# Patient Record
Sex: MALE | Race: WHITE | NOT HISPANIC OR LATINO | Employment: FULL TIME | URBAN - METROPOLITAN AREA
[De-identification: names, ages, dates, MRNs, and addresses within clinical notes are randomized per-mention and may not be internally consistent; named-entity substitution may affect disease eponyms.]

---

## 2023-10-11 PROBLEM — S06.0XAA CONCUSSION: Status: ACTIVE | Noted: 2023-10-11

## 2023-10-11 PROBLEM — L01.00 IMPETIGO: Status: ACTIVE | Noted: 2023-10-11

## 2023-10-11 PROBLEM — U07.1 COVID-19: Status: ACTIVE | Noted: 2023-10-11

## 2023-10-11 RX ORDER — MUPIROCIN 20 MG/G
OINTMENT TOPICAL
COMMUNITY
Start: 2022-11-09 | End: 2023-10-12 | Stop reason: ALTCHOICE

## 2023-10-11 RX ORDER — CLOTRIMAZOLE 1 %
1 CREAM (GRAM) TOPICAL
COMMUNITY
Start: 2022-10-15 | End: 2023-10-12 | Stop reason: ALTCHOICE

## 2023-10-11 RX ORDER — FLUCONAZOLE 150 MG/1
150 TABLET ORAL
COMMUNITY
Start: 2022-10-15 | End: 2023-10-12 | Stop reason: ALTCHOICE

## 2023-10-11 RX ORDER — DOXYCYCLINE 100 MG/1
1 TABLET ORAL 2 TIMES DAILY
COMMUNITY
Start: 2022-11-09 | End: 2023-10-12 | Stop reason: ALTCHOICE

## 2023-10-12 ENCOUNTER — OFFICE VISIT (OUTPATIENT)
Dept: GASTROENTEROLOGY | Facility: HOSPITAL | Age: 28
End: 2023-10-12
Payer: COMMERCIAL

## 2023-10-12 VITALS
BODY MASS INDEX: 26.04 KG/M2 | SYSTOLIC BLOOD PRESSURE: 134 MMHG | HEIGHT: 71 IN | WEIGHT: 186 LBS | DIASTOLIC BLOOD PRESSURE: 71 MMHG | TEMPERATURE: 97 F | HEART RATE: 64 BPM

## 2023-10-12 DIAGNOSIS — K21.00 GASTROESOPHAGEAL REFLUX DISEASE WITH ESOPHAGITIS WITHOUT HEMORRHAGE: Primary | ICD-10-CM

## 2023-10-12 PROCEDURE — 99202 OFFICE O/P NEW SF 15 MIN: CPT | Performed by: STUDENT IN AN ORGANIZED HEALTH CARE EDUCATION/TRAINING PROGRAM

## 2023-10-12 PROCEDURE — 99212 OFFICE O/P EST SF 10 MIN: CPT | Performed by: STUDENT IN AN ORGANIZED HEALTH CARE EDUCATION/TRAINING PROGRAM

## 2023-10-12 RX ORDER — OMEPRAZOLE 40 MG/1
40 CAPSULE, DELAYED RELEASE ORAL
COMMUNITY
End: 2024-01-16 | Stop reason: WASHOUT

## 2023-10-12 NOTE — PROGRESS NOTES
"Gastroenterology Clinic Consult Note    Reason For Consult  GERD    History Of Present Illness  Kilo Hayes is a 28 y.o. male presents for GERD.  Symptoms started February. Had EGD 5/2023 at GI office in [place] for this.   Pt showed me a report from the smart phone which showed;  Grade A esophagitis. Normal stomach. Non-bleeding diverticulum with a small opening in the second part of duodenum.  Pt takes omeprazole 40mg and pepcid 20mg BID every day and feels pepcid works better.  Symptoms happens mostly in morning after drinking coffee and gets better once he goes to [employer] (). Occurs once or twice in a week. No smoking. Has good bowel movement. No specific food trigger.  Fhx has no GI malignancy history,     Past Medical History  He has no past medical history on file.    Surgical History  He has no past surgical history on file.     Social History  He reports that he has never smoked. He does not have any smokeless tobacco history on file. He reports that he does not use drugs. No history on file for alcohol use.    Family History  Family History   Problem Relation Name Age of Onset    No Known Problems Other          Allergies  Patient has no known allergies.    Home Medications  (Not in a hospital admission)      Review of Systems  As per HPI     Physical Exam  General: well-nourished, no acute distress  HEENT: PERRLA, EOM intact, no scleral icterus, moist MM  Respiratory: CTA bilaterally, normal work of breathing  Cardiovascular: RRR, no murmurs/rubs/gallops  Abdomen: Soft, nontender, nondistended, bowel sounds present, no masses palpated, no organomeagly  Extremities: no edema, no asterixis  Neuro: alert and oriented, CNII-XII grossly intact, moves all 4 extremities with no focal deficits     Last Recorded Vitals  Blood pressure 134/71, pulse 64, temperature 36.1 °C (97 °F), height 1.803 m (5' 11\"), weight 84.4 kg (186 lb).      Relevant Results    Current Outpatient Medications:     " "omeprazole (PriLOSEC) 40 mg DR capsule, Take 1 capsule (40 mg) by mouth. Do not crush or chew., Disp: , Rfl:      No results found for: \"WBC\", \"HGB\", \"HCT\", \"MCV\", \"PLT\"  No results found for: \"GLUCOSE\", \"CALCIUM\", \"NA\", \"K\", \"CO2\", \"CL\", \"BUN\", \"CREATININE\"  No results found for: \"ALT\", \"AST\", \"GGT\", \"ALKPHOS\", \"BILITOT\"    Imaging:    None     ASSESSMENT/PLAN:    Kilo Hayes is a 28 y.o. male presents for GERD.    #GERD  ::No alcohol/smoking.  ::Takes pepcid 20mg BID and omeprazole 40mg every day  ::No red flag signs.  ::BMI 25  ::EGD 5/2023 at GI office in [place] ;Grade A esophagitis. Normal stomach. Non-bleeding diverticulum with a small opening in the second part of duodenum.    Plan  -increase pepcid to 40mg BID from 20mg BID  -lifestyle modification educated  -check stool H.pylori   -RTC if no improvement in symptom    Case discussed with Dr. Bruce.    I spent 20 minutes in the professional and overall care of this patient.    Aubree Meza MD    "

## 2023-10-14 RX ORDER — FAMOTIDINE 40 MG/1
40 TABLET, FILM COATED ORAL 2 TIMES DAILY
Qty: 120 TABLET | Refills: 3 | Status: SHIPPED | OUTPATIENT
Start: 2023-10-14 | End: 2024-06-10

## 2023-12-12 ENCOUNTER — OFFICE VISIT (OUTPATIENT)
Dept: ORTHOPEDIC SURGERY | Facility: CLINIC | Age: 28
End: 2023-12-12
Payer: COMMERCIAL

## 2023-12-12 ENCOUNTER — ANCILLARY PROCEDURE (OUTPATIENT)
Dept: RADIOLOGY | Facility: CLINIC | Age: 28
End: 2023-12-12
Payer: COMMERCIAL

## 2023-12-12 DIAGNOSIS — R10.2 PAIN IN PELVIS: ICD-10-CM

## 2023-12-12 PROCEDURE — 72170 X-RAY EXAM OF PELVIS: CPT | Performed by: RADIOLOGY

## 2023-12-12 PROCEDURE — 99214 OFFICE O/P EST MOD 30 MIN: CPT | Performed by: ORTHOPAEDIC SURGERY

## 2023-12-12 PROCEDURE — 72170 X-RAY EXAM OF PELVIS: CPT

## 2023-12-12 RX ORDER — MELOXICAM 15 MG/1
15 TABLET ORAL DAILY
Qty: 30 TABLET | Refills: 1 | Status: SHIPPED | OUTPATIENT
Start: 2023-12-12 | End: 2024-02-10

## 2023-12-12 NOTE — PROGRESS NOTES
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  He has been having the intermittent moderate throbbing discomfort mostly along the inguinal region and anterior aspect of the left hip for the past week.  No particular traumatic event.  Pain improves after approximately 10 minutes of warm up.  He has noted some discomfort with frequent games and then riding on the bus.  2 years ago he had a MRI of the pelvis that demonstrated a strain involving the adductor longus.  He had PRP.  He has some tenderness and mild discomfort in this area currently but it is manageable.    Pleasant in no acute distress.  Walks with normal gait.  There is symmetric range of motion of both hips without pain.  Negative FADIR and IVAN test bilaterally.  There is tenderness along the adductor longus on the left and not on the right.  There is pain with resisted adduction and mild discomfort with resisted hip flexion.  There is tenderness along the anterior aspect of the hip along the rectus and sartorius.  No pubic symphysis tenderness.  No tenderness on the right hip.  Both lower extremities are well-perfused and muscle tone is adequate.    AP pelvis x-rays personally reviewed and the hip joint spaces are well maintained.  No acute bony abnormalities.    A discussion about his hip pain was done.  He does have some continued symptoms along the adductor tendons.  He is also symptomatic along the hip flexor region.  Appropriate warm up and treatment after games and practice was discussed.  He was prescribed meloxicam.  If he is having persistent symptoms then advanced imaging could be considered.

## 2024-01-16 ENCOUNTER — PROCEDURE VISIT (OUTPATIENT)
Dept: PSYCHOLOGY | Facility: HOSPITAL | Age: 29
End: 2024-01-16
Payer: COMMERCIAL

## 2024-01-16 DIAGNOSIS — S06.0XAA CONCUSSION WITH UNKNOWN LOSS OF CONSCIOUSNESS STATUS, INITIAL ENCOUNTER: Primary | ICD-10-CM

## 2024-01-16 PROCEDURE — 96132 NRPSYC TST EVAL PHYS/QHP 1ST: CPT | Performed by: CLINICAL NEUROPSYCHOLOGIST

## 2024-01-16 NOTE — PROGRESS NOTES
NEUROPSYCHOLOGICAL CONSULTATION REPORT    PLAYER NAME:  Kilo Hayes  DATE OF INJURY:  01/06/24  DATE OF CONSULT:  01/16/24    HISTORY: Kilo Hayes is a 28 y.o. 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 He sustained the injury during a hockey match when he was struck from behind unexpectedly, resulting in his falling backwards on the ice.  He reported being initially dazed and did not believe he lost consciousness, though he acknowledged that it might have appeared that he did because his arms were extended after he fell backward.  He was immediately removed from play and evaluated by his sports medicine team and reported showing no change on the SCAT/sideline examination outside of some mild symptom endorsement.  It was decided to place him into the concussion protocol and he reported that he did notice some headache on the following day which continued for approximately 1 week.  Also of note, the patient's first child was born on 1/9/24 and his schedule has been substantially disrupted with that event (including having minimal sleep).  He noted that he experienced progressive improvement with symptoms over time, particularly with soft tissue massage to his cervical region by a sports medicine team.  He has engaged in the initial stages of the exercise protocol without symptom exacerbation.  At the time of the current evaluation, the athlete reported feeling 100% back to baseline.         PAST MEDICAL HISTORY:  In addition to his history of recent concussion, he reported 3 prior concussions (2015, 2020, and in 2023).  All concussions were reported to have resolved without complication and usually within 1 week of the events.  His 2023 injury was while with the [employer] and data were available for review from a clearance evaluation by my office.  He showed performance that was back to baseline within 5 days of that  event.  No other additional contributory neurological/medical history was endorsed.      CURRENT MEDICATIONS: None.    PAST PSYCHIATRIC HISTORY:  The athlete denied changes in psychiatric status or behavioral symptoms following the injury on 1/6/2024.  He did note some subtle anxiety symptoms at his baseline and has met previously with sports psychology providers.  He also denied any other psychiatric history relevant to his concussion recovery.     EDUCATIONAL HISTORY:  The athlete reported having completed an RADHA.  He denied any previously diagnosed ADHD/LD or other developmental concerns and noted generally being a strong student.    NEURODIAGNOSTIC STUDIES: N/A.    EXAMINATION FINDINGS:  In addition to the clinical interview, the following measures were administered:  ImPACT  Wechsler Adult Intelligence Scale, 4th Edition (WAIS-IV), Matrix Reasoning  Whiting Verbal Learning Test - Revised (HVLT-R)  Brief Visuospatial Memory Test - Revised (BVMT-R)  Controlled Oral Word Association (COWA)  Symbol Digit Modalities Test (SDMT)   PSU Cancellation  Trail Making Test, Part A  Trail Making Test, Part B  Post-Concussive Scale, Revised (PCS-R)  Abebe Depression Inventory, 2nd Edition (BDI-II)  State Trait Anxiety Inventory (STAI)    The athlete completed computerized concussion testing (ImPACT).  His most recent baseline testing was completed on 9/15/23, with 2 other baseline administrations (2/2/21 and 9/10/21) also available for review. Repeat testing at the time of the current evaluation showed no decline relative to his most recent baseline or aggregate baseline scores across ImPACT composites.    Baseline data on paper/pencil neurocognitive measures was completed on 9/15/23 and a premorbid estimate (WAIS-IV Matrix Reasoning) placed him in the high average range . All performances were at least consistent with baseline testing/premorbid estimates, with scores ranging from the high average to the exceptionally high  range, including visual attention/processing speed (SDMT, PSU Cancellation, and Trail Making Test, Part A; exceptionally high), verbal fluency/executive functioning (COWA and Trail Making Test, Part B; above average to exceptionally high), verbal memory (HVLT-R; high average to above average), and visual learning/memory (BVMT-R and SDMT Memory; high average).     The athlete endorsed no symptoms prior to or following testing (PCS-R). He also endorsed no clinically meaningful symptoms on the BDI-II and STAI.    CLINICAL IMPRESSION & RECOMMENDATIONS:    Kilo Hayes was diagnosed with a concussion on 1/6/24 but has reported full symptom freedom and no symptom exacerbation with the early steps of the exercise protocol. On a hybrid battery of neurocognitive testing at the time of the current evaluation, Mr. Hayes showed performance that was at least consistent with baseline levels and premorbid estimates across all measures.

## 2024-04-24 DIAGNOSIS — M25.519 SHOULDER PAIN, UNSPECIFIED CHRONICITY, UNSPECIFIED LATERALITY: Primary | ICD-10-CM

## 2024-04-24 RX ORDER — CELECOXIB 200 MG/1
200 CAPSULE ORAL 2 TIMES DAILY
Qty: 28 CAPSULE | Refills: 0 | Status: SHIPPED | OUTPATIENT
Start: 2024-04-24 | End: 2024-05-08

## 2024-04-30 ENCOUNTER — APPOINTMENT (OUTPATIENT)
Dept: PHYSICAL THERAPY | Facility: CLINIC | Age: 29
End: 2024-04-30
Payer: COMMERCIAL

## 2024-05-02 ENCOUNTER — APPOINTMENT (OUTPATIENT)
Dept: PHYSICAL THERAPY | Facility: CLINIC | Age: 29
End: 2024-05-02
Payer: COMMERCIAL

## 2024-05-07 ENCOUNTER — APPOINTMENT (OUTPATIENT)
Dept: PHYSICAL THERAPY | Facility: CLINIC | Age: 29
End: 2024-05-07
Payer: COMMERCIAL

## 2024-05-09 ENCOUNTER — APPOINTMENT (OUTPATIENT)
Dept: PHYSICAL THERAPY | Facility: CLINIC | Age: 29
End: 2024-05-09
Payer: COMMERCIAL